# Patient Record
Sex: MALE | Race: WHITE
[De-identification: names, ages, dates, MRNs, and addresses within clinical notes are randomized per-mention and may not be internally consistent; named-entity substitution may affect disease eponyms.]

---

## 2018-10-25 ENCOUNTER — HOSPITAL ENCOUNTER (INPATIENT)
Dept: HOSPITAL 83 - ED | Age: 78
LOS: 1 days | Discharge: HOME | DRG: 552 | End: 2018-10-26
Attending: INTERNAL MEDICINE | Admitting: INTERNAL MEDICINE
Payer: MEDICARE

## 2018-10-25 VITALS — DIASTOLIC BLOOD PRESSURE: 68 MMHG

## 2018-10-25 VITALS — DIASTOLIC BLOOD PRESSURE: 64 MMHG

## 2018-10-25 VITALS — DIASTOLIC BLOOD PRESSURE: 65 MMHG

## 2018-10-25 VITALS — SYSTOLIC BLOOD PRESSURE: 152 MMHG | DIASTOLIC BLOOD PRESSURE: 74 MMHG

## 2018-10-25 VITALS — DIASTOLIC BLOOD PRESSURE: 60 MMHG

## 2018-10-25 VITALS — BODY MASS INDEX: 27.08 KG/M2 | HEIGHT: 70 IN | WEIGHT: 189.13 LBS

## 2018-10-25 VITALS — DIASTOLIC BLOOD PRESSURE: 44 MMHG | SYSTOLIC BLOOD PRESSURE: 160 MMHG

## 2018-10-25 DIAGNOSIS — K21.9: ICD-10-CM

## 2018-10-25 DIAGNOSIS — E78.5: ICD-10-CM

## 2018-10-25 DIAGNOSIS — D72.829: ICD-10-CM

## 2018-10-25 DIAGNOSIS — I25.2: ICD-10-CM

## 2018-10-25 DIAGNOSIS — M53.86: ICD-10-CM

## 2018-10-25 DIAGNOSIS — M48.061: Primary | ICD-10-CM

## 2018-10-25 DIAGNOSIS — Z79.82: ICD-10-CM

## 2018-10-25 DIAGNOSIS — Z79.899: ICD-10-CM

## 2018-10-25 DIAGNOSIS — Z88.2: ICD-10-CM

## 2018-10-25 DIAGNOSIS — I25.10: ICD-10-CM

## 2018-10-25 DIAGNOSIS — R31.21: ICD-10-CM

## 2018-10-25 DIAGNOSIS — Z95.5: ICD-10-CM

## 2018-10-25 DIAGNOSIS — I10: ICD-10-CM

## 2018-10-25 DIAGNOSIS — R00.1: ICD-10-CM

## 2018-10-25 DIAGNOSIS — B02.30: ICD-10-CM

## 2018-10-25 DIAGNOSIS — H26.491: ICD-10-CM

## 2018-10-25 DIAGNOSIS — R73.9: ICD-10-CM

## 2018-10-25 LAB
25(OH)D3 SERPL-MCNC: 44.1 NG/ML (ref 30–100)
ALBUMIN SERPL-MCNC: 4 GM/DL (ref 3.1–4.5)
ALP SERPL-CCNC: 52 U/L (ref 45–117)
ALT SERPL W P-5'-P-CCNC: 32 U/L (ref 12–78)
APPEARANCE UR: (no result)
APTT PPP: 23.1 SECONDS (ref 20.8–31.5)
AST SERPL-CCNC: 22 IU/L (ref 3–35)
BACTERIA #/AREA URNS HPF: (no result) /[HPF]
BILIRUB UR QL STRIP: NEGATIVE
BUN SERPL-MCNC: 24 MG/DL (ref 7–24)
CHLORIDE SERPL-SCNC: 107 MMOL/L (ref 98–107)
CHOLEST SERPL-MCNC: 101 MG/DL (ref ?–200)
COLOR UR: YELLOW
CREAT SERPL-MCNC: 1.21 MG/DL (ref 0.7–1.3)
EPI CELLS #/AREA URNS HPF: (no result) /[HPF]
ERYTHROCYTE [DISTWIDTH] IN BLOOD BY AUTOMATED COUNT: 12.5 % (ref 0–14.5)
GLUCOSE UR QL: NEGATIVE
HCT VFR BLD AUTO: 40.6 % (ref 42–52)
HDLC SERPL-MCNC: 34 MG/DL (ref 40–60)
HGB BLD-MCNC: 14.2 G/DL (ref 14–18)
HGB UR QL STRIP: NEGATIVE
INR BLD: 1 (ref 2–3.5)
KETONES UR QL STRIP: NEGATIVE
LDLC SERPL DIRECT ASSAY-MCNC: 55 MG/DL (ref 9–159)
LEUKOCYTE ESTERASE UR QL STRIP: NEGATIVE
MCH RBC QN AUTO: 32.9 PG (ref 27–31)
MCHC RBC AUTO-ENTMCNC: 35 G/DL (ref 33–37)
MCV RBC AUTO: 94 FL (ref 80–94)
NITRITE UR QL STRIP: NEGATIVE
NRBC BLD QL AUTO: 0 10*3/UL (ref 0–0)
PH UR STRIP: 7.5 [PH] (ref 5–9)
PHOSPHATE SERPL-MCNC: 2.7 MG/DL (ref 2.5–4.9)
PLATELET # BLD AUTO: 164 10*3/UL (ref 130–400)
PLATELET SUFFICIENCY: NORMAL
PMV BLD AUTO: 9.1 FL (ref 9.6–12.3)
POTASSIUM SERPL-SCNC: 3.8 MMOL/L (ref 3.5–5.1)
PROT SERPL-MCNC: 6.7 GM/DL (ref 6.4–8.2)
RBC # BLD AUTO: 4.32 10*6/UL (ref 4.5–5.9)
RBC #/AREA URNS HPF: (no result) RBC/HPF (ref 0–2)
RBC MORPH BLD: NORMAL
SODIUM SERPL-SCNC: 142 MMOL/L (ref 136–145)
SP GR UR: 1.01 (ref 1–1.03)
T4 FREE SERPL-MCNC: 1.02 NG/DL (ref 0.76–1.46)
TOTAL CELLS COUNTED: 100 #CELLS
TRIGL SERPL-MCNC: 58 MG/DL (ref ?–150)
TSH SERPL DL<=0.005 MIU/L-ACNC: 1.32 UIU/ML (ref 0.36–4.75)
UROBILINOGEN UR STRIP-MCNC: 0.2 E.U./DL (ref 0.2–1)
VITAMIN B12: 723 PG/ML (ref 247–911)
VLDLC SERPL CALC-MCNC: 12 MG/DL (ref 6–40)
WBC #/AREA URNS HPF: (no result) WBC/HPF (ref 0–5)
WBC NRBC COR # BLD AUTO: 12.1 10*3/UL (ref 4.8–10.8)

## 2018-10-26 VITALS — SYSTOLIC BLOOD PRESSURE: 123 MMHG | DIASTOLIC BLOOD PRESSURE: 67 MMHG

## 2018-10-26 VITALS — DIASTOLIC BLOOD PRESSURE: 58 MMHG | SYSTOLIC BLOOD PRESSURE: 124 MMHG

## 2019-12-09 ENCOUNTER — HOSPITAL ENCOUNTER (INPATIENT)
Dept: HOSPITAL 83 - ED | Age: 79
LOS: 1 days | Discharge: HOME | DRG: 641 | End: 2019-12-10
Attending: INTERNAL MEDICINE | Admitting: INTERNAL MEDICINE
Payer: MEDICARE

## 2019-12-09 VITALS — SYSTOLIC BLOOD PRESSURE: 150 MMHG | DIASTOLIC BLOOD PRESSURE: 71 MMHG

## 2019-12-09 VITALS — DIASTOLIC BLOOD PRESSURE: 68 MMHG | BODY MASS INDEX: 26.38 KG/M2 | HEIGHT: 70 IN | WEIGHT: 184.25 LBS

## 2019-12-09 VITALS — SYSTOLIC BLOOD PRESSURE: 130 MMHG | DIASTOLIC BLOOD PRESSURE: 60 MMHG

## 2019-12-09 VITALS — DIASTOLIC BLOOD PRESSURE: 62 MMHG

## 2019-12-09 VITALS — DIASTOLIC BLOOD PRESSURE: 72 MMHG

## 2019-12-09 DIAGNOSIS — T44.7X5A: ICD-10-CM

## 2019-12-09 DIAGNOSIS — Z80.6: ICD-10-CM
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
## 2019-12-09 DIAGNOSIS — I10: ICD-10-CM

## 2019-12-09 DIAGNOSIS — R00.1: ICD-10-CM

## 2019-12-09 DIAGNOSIS — E78.5: ICD-10-CM

## 2019-12-09 DIAGNOSIS — E86.0: Primary | ICD-10-CM

## 2019-12-09 DIAGNOSIS — Z79.82: ICD-10-CM

## 2019-12-09 DIAGNOSIS — Z88.2: ICD-10-CM

## 2019-12-09 DIAGNOSIS — R73.9: ICD-10-CM

## 2019-12-09 DIAGNOSIS — K21.9: ICD-10-CM

## 2019-12-09 DIAGNOSIS — I25.10: ICD-10-CM

## 2019-12-09 DIAGNOSIS — Z80.41: ICD-10-CM

## 2019-12-09 DIAGNOSIS — I25.2: ICD-10-CM

## 2019-12-09 DIAGNOSIS — E83.41: ICD-10-CM

## 2019-12-09 LAB
ALBUMIN SERPL-MCNC: 3.8 GM/DL (ref 3.1–4.5)
ALP SERPL-CCNC: 54 U/L (ref 45–117)
ALT SERPL W P-5'-P-CCNC: 38 U/L (ref 12–78)
APTT PPP: 22.5 SECONDS (ref 20–32.1)
AST SERPL-CCNC: 27 IU/L (ref 3–35)
BASOPHILS # BLD AUTO: 0 10*3/UL (ref 0–0.1)
BASOPHILS NFR BLD AUTO: 0.4 % (ref 0–1)
BUN SERPL-MCNC: 18 MG/DL (ref 7–24)
CHLORIDE SERPL-SCNC: 106 MMOL/L (ref 98–107)
CREAT SERPL-MCNC: 1.27 MG/DL (ref 0.7–1.3)
EOSINOPHIL # BLD AUTO: 0.3 10*3/UL (ref 0–0.4)
EOSINOPHIL # BLD AUTO: 3.3 % (ref 1–4)
ERYTHROCYTE [DISTWIDTH] IN BLOOD BY AUTOMATED COUNT: 12.9 % (ref 0–14.5)
HCT VFR BLD AUTO: 41.3 % (ref 42–52)
HGB BLD-MCNC: 14.2 G/DL (ref 14–18)
INR BLD: 0.9 (ref 2–3.5)
LIPASE SERPL-CCNC: 125 U/L (ref 73–393)
LYMPHOCYTES # BLD AUTO: 4.5 10*3/UL (ref 1.3–4.4)
LYMPHOCYTES NFR BLD AUTO: 43.5 % (ref 27–41)
MCH RBC QN AUTO: 32.9 PG (ref 27–31)
MCHC RBC AUTO-ENTMCNC: 34.4 G/DL (ref 33–37)
MCV RBC AUTO: 95.6 FL (ref 80–94)
MONOCYTES # BLD AUTO: 0.7 10*3/UL (ref 0.1–1)
MONOCYTES NFR BLD MANUAL: 7.1 % (ref 3–9)
NEUT #: 4.7 10*3/UL (ref 2.3–7.9)
NEUT %: 45.2 % (ref 47–73)
NRBC BLD QL AUTO: 0 10*3/UL (ref 0–0)
PLATELET # BLD AUTO: 163 10*3/UL (ref 130–400)
PMV BLD AUTO: 9.9 FL (ref 9.6–12.3)
POTASSIUM SERPL-SCNC: 4.3 MMOL/L (ref 3.5–5.1)
PROT SERPL-MCNC: 6.6 GM/DL (ref 6.4–8.2)
RBC # BLD AUTO: 4.32 10*6/UL (ref 4.5–5.9)
SODIUM SERPL-SCNC: 139 MMOL/L (ref 136–145)
TROPONIN I SERPL-MCNC: < 0.015 NG/ML (ref ?–0.04)
WBC NRBC COR # BLD AUTO: 10.4 10*3/UL (ref 4.8–10.8)

## 2019-12-09 NOTE — NUR
A 79, admitted to , under the
services of GREGORY Vizcaino DO with a diagnosis of SYNCOPE.
Chief complaint is SYNCOPAL EPISODE.
Patient arrived via ambulance from ER.
Monitor applied. Initial assessment completed.
Vital signs taken and recorded.
GREGORY VIZCAINO DO notified of admission to the unit.
Orders received.
See assessment for past medical history, medications
and allergies.
Patient and/or family oriented to unit. [facility]
visitation policy reviewed.
Clothing/patient valuable form completed.
 
EYAD HODGE

## 2019-12-10 VITALS — SYSTOLIC BLOOD PRESSURE: 130 MMHG | DIASTOLIC BLOOD PRESSURE: 60 MMHG

## 2019-12-10 VITALS — DIASTOLIC BLOOD PRESSURE: 62 MMHG | SYSTOLIC BLOOD PRESSURE: 114 MMHG

## 2019-12-10 VITALS — DIASTOLIC BLOOD PRESSURE: 59 MMHG | SYSTOLIC BLOOD PRESSURE: 144 MMHG

## 2019-12-10 LAB
25(OH)D3 SERPL-MCNC: 46.3 NG/ML (ref 30–100)
ALBUMIN SERPL-MCNC: 3.4 GM/DL (ref 3.1–4.5)
ALP SERPL-CCNC: 48 U/L (ref 45–117)
ALT SERPL W P-5'-P-CCNC: 32 U/L (ref 12–78)
APTT PPP: 25.1 SECONDS (ref 20–32.1)
AST SERPL-CCNC: 19 IU/L (ref 3–35)
BASOPHILS # BLD AUTO: 0.1 10*3/UL (ref 0–0.1)
BASOPHILS NFR BLD AUTO: 0.5 % (ref 0–1)
BUN SERPL-MCNC: 19 MG/DL (ref 7–24)
CHLORIDE SERPL-SCNC: 110 MMOL/L (ref 98–107)
CHOLEST SERPL-MCNC: 108 MG/DL (ref ?–200)
CREAT SERPL-MCNC: 1.15 MG/DL (ref 0.7–1.3)
EOSINOPHIL # BLD AUTO: 0.4 10*3/UL (ref 0–0.4)
EOSINOPHIL # BLD AUTO: 4 % (ref 1–4)
ERYTHROCYTE [DISTWIDTH] IN BLOOD BY AUTOMATED COUNT: 12.9 % (ref 0–14.5)
HCT VFR BLD AUTO: 38.9 % (ref 42–52)
HDLC SERPL-MCNC: 27 MG/DL (ref 40–60)
HGB BLD-MCNC: 13 G/DL (ref 14–18)
INR BLD: 1 (ref 2–3.5)
LDLC SERPL DIRECT ASSAY-MCNC: 61 MG/DL (ref 9–159)
LYMPHOCYTES # BLD AUTO: 3.8 10*3/UL (ref 1.3–4.4)
LYMPHOCYTES NFR BLD AUTO: 41.5 % (ref 27–41)
MCH RBC QN AUTO: 32.1 PG (ref 27–31)
MCHC RBC AUTO-ENTMCNC: 33.4 G/DL (ref 33–37)
MCV RBC AUTO: 96 FL (ref 80–94)
MONOCYTES # BLD AUTO: 1.1 10*3/UL (ref 0.1–1)
MONOCYTES NFR BLD MANUAL: 11.8 % (ref 3–9)
NEUT #: 3.9 10*3/UL (ref 2.3–7.9)
NEUT %: 41.9 % (ref 47–73)
NRBC BLD QL AUTO: 0 10*3/UL (ref 0–0)
PHOSPHATE SERPL-MCNC: 3.3 MG/DL (ref 2.5–4.9)
PLATELET # BLD AUTO: 160 10*3/UL (ref 130–400)
PMV BLD AUTO: 10 FL (ref 9.6–12.3)
POTASSIUM SERPL-SCNC: 4 MMOL/L (ref 3.5–5.1)
PROT SERPL-MCNC: 6 GM/DL (ref 6.4–8.2)
RBC # BLD AUTO: 4.05 10*6/UL (ref 4.5–5.9)
SODIUM SERPL-SCNC: 142 MMOL/L (ref 136–145)
T4 FREE SERPL-MCNC: 0.89 NG/DL (ref 0.76–1.46)
TRIGL SERPL-MCNC: 99 MG/DL (ref ?–150)
TSH SERPL DL<=0.005 MIU/L-ACNC: 0.95 UIU/ML (ref 0.36–4.75)
VITAMIN B12: 592 PG/ML (ref 247–911)
VLDLC SERPL CALC-MCNC: 20 MG/DL (ref 6–40)
WBC NRBC COR # BLD AUTO: 9.3 10*3/UL (ref 4.8–10.8)

## 2019-12-10 NOTE — NUR
Discharge instructions reviewed with patient/family. Patient receptive and
verbalizes understanding. Follow-up care arranged. Written instructions given
to patient/family. IV site and telemetry monitor removed.
EYAD HODGE

## 2019-12-10 NOTE — NUR
in to talk to patient.
Patient states lives at home with his wife.
There are 12 steps in the home.
Physician: Dr. Kyler Frederick
Pharmacy: [facility]
Home health services: none
Patient's level of ADLs: INDEPENDENT
Patient has working utilities: yes
DME: none
Follow-up physician's appointment after d/c: will be made by the hospitalist
nurse director upon discharge
Does patient want to access PORTAL?: no
Discharge plan discussed with patient. He lives at home with his wife. He is
independent in his ADLs and ambulation. Discussed home health care services and
he denies any home needs at this time. When medically stable he will be
discharged to home. His wife will provide transportation on discharge.
 
ADOLFO RENEE

## 2020-05-11 ENCOUNTER — HOSPITAL ENCOUNTER (EMERGENCY)
Dept: HOSPITAL 83 - ED | Age: 80
Discharge: HOME | End: 2020-05-11
Payer: MEDICARE

## 2020-05-11 VITALS — HEIGHT: 70 IN | WEIGHT: 180 LBS | BODY MASS INDEX: 25.77 KG/M2

## 2020-05-11 DIAGNOSIS — K21.9: ICD-10-CM

## 2020-05-11 DIAGNOSIS — E78.00: ICD-10-CM

## 2020-05-11 DIAGNOSIS — I25.2: ICD-10-CM

## 2020-05-11 DIAGNOSIS — R11.0: Primary | ICD-10-CM

## 2020-05-11 DIAGNOSIS — E78.5: ICD-10-CM

## 2020-05-11 DIAGNOSIS — Z88.2: ICD-10-CM

## 2020-05-11 DIAGNOSIS — R10.9: ICD-10-CM

## 2020-05-11 DIAGNOSIS — I25.10: ICD-10-CM

## 2020-05-11 DIAGNOSIS — Z79.899: ICD-10-CM

## 2020-05-11 DIAGNOSIS — Z79.82: ICD-10-CM

## 2020-05-11 LAB
ALBUMIN SERPL-MCNC: 3.7 GM/DL (ref 3.1–4.5)
ALP SERPL-CCNC: 52 U/L (ref 45–117)
ALT SERPL W P-5'-P-CCNC: 35 U/L (ref 12–78)
APTT PPP: 25.6 SECONDS (ref 20–32.1)
AST SERPL-CCNC: 35 IU/L (ref 3–35)
BUN SERPL-MCNC: 19 MG/DL (ref 7–24)
CHLORIDE SERPL-SCNC: 109 MMOL/L (ref 98–107)
CREAT SERPL-MCNC: 1.07 MG/DL (ref 0.7–1.3)
ERYTHROCYTE [DISTWIDTH] IN BLOOD BY AUTOMATED COUNT: 12.7 % (ref 0–14.5)
HCT VFR BLD AUTO: 41.6 % (ref 42–52)
INR BLD: 1 (ref 2–3.5)
LIPASE SERPL-CCNC: 101 U/L (ref 73–393)
MCH RBC QN AUTO: 32 PG (ref 27–31)
MCHC RBC AUTO-ENTMCNC: 33.9 G/DL (ref 33–37)
MCV RBC AUTO: 94.5 FL (ref 80–94)
NRBC BLD QL AUTO: 0 10*3/UL (ref 0–0)
PLATELET # BLD AUTO: 180 10*3/UL (ref 130–400)
PLATELET SUFFICIENCY: NORMAL
PMV BLD AUTO: 9.5 FL (ref 9.6–12.3)
POTASSIUM SERPL-SCNC: 3.8 MMOL/L (ref 3.5–5.1)
PROT SERPL-MCNC: 6.6 GM/DL (ref 6.4–8.2)
RBC # BLD AUTO: 4.4 10*6/UL (ref 4.5–5.9)
RBC MORPH BLD: NORMAL
SODIUM SERPL-SCNC: 141 MMOL/L (ref 136–145)
TOTAL CELLS COUNTED: 100 #CELLS
TROPONIN I SERPL-MCNC: < 0.015 NG/ML (ref ?–0.04)
VARIANT LYMPHS NFR BLD MANUAL: 1 % (ref 0–0)
WBC NRBC COR # BLD AUTO: 10.3 10*3/UL (ref 4.8–10.8)

## 2021-02-19 ENCOUNTER — HOSPITAL ENCOUNTER (OUTPATIENT)
Dept: HOSPITAL 83 - ORTHO | Age: 81
Discharge: HOME | End: 2021-02-19
Attending: ORTHOPAEDIC SURGERY
Payer: MEDICARE

## 2021-02-19 DIAGNOSIS — M17.12: Primary | ICD-10-CM

## 2021-04-19 VITALS — DIASTOLIC BLOOD PRESSURE: 67 MMHG | SYSTOLIC BLOOD PRESSURE: 152 MMHG

## 2021-04-19 LAB
BUN SERPL-MCNC: 22 MG/DL (ref 7–24)
CHLORIDE SERPL-SCNC: 109 MMOL/L (ref 98–107)
CREAT SERPL-MCNC: 1.08 MG/DL (ref 0.7–1.3)
POTASSIUM SERPL-SCNC: 4.4 MMOL/L (ref 3.5–5.1)
SODIUM SERPL-SCNC: 143 MMOL/L (ref 136–145)

## 2021-04-22 ENCOUNTER — HOSPITAL ENCOUNTER (OUTPATIENT)
Dept: HOSPITAL 83 - SDC | Age: 81
Discharge: HOME | End: 2021-04-22
Attending: ORTHOPAEDIC SURGERY
Payer: MEDICARE

## 2021-04-22 VITALS — DIASTOLIC BLOOD PRESSURE: 70 MMHG | SYSTOLIC BLOOD PRESSURE: 130 MMHG

## 2021-04-22 VITALS — SYSTOLIC BLOOD PRESSURE: 138 MMHG | DIASTOLIC BLOOD PRESSURE: 55 MMHG

## 2021-04-22 VITALS — BODY MASS INDEX: 25.77 KG/M2 | HEIGHT: 70 IN | WEIGHT: 180 LBS

## 2021-04-22 VITALS — DIASTOLIC BLOOD PRESSURE: 74 MMHG | SYSTOLIC BLOOD PRESSURE: 119 MMHG

## 2021-04-22 VITALS — DIASTOLIC BLOOD PRESSURE: 63 MMHG

## 2021-04-22 DIAGNOSIS — I25.10: ICD-10-CM

## 2021-04-22 DIAGNOSIS — E78.00: ICD-10-CM

## 2021-04-22 DIAGNOSIS — Z79.899: ICD-10-CM

## 2021-04-22 DIAGNOSIS — Z95.5: ICD-10-CM

## 2021-04-22 DIAGNOSIS — M17.12: ICD-10-CM

## 2021-04-22 DIAGNOSIS — K21.9: ICD-10-CM

## 2021-04-22 DIAGNOSIS — M65.841: Primary | ICD-10-CM

## 2021-04-22 DIAGNOSIS — I10: ICD-10-CM

## 2021-04-22 DIAGNOSIS — I25.2: ICD-10-CM

## 2021-04-22 DIAGNOSIS — Z88.2: ICD-10-CM

## 2021-05-29 ENCOUNTER — HOSPITAL ENCOUNTER (EMERGENCY)
Dept: HOSPITAL 83 - ED | Age: 81
Discharge: HOME | End: 2021-05-29
Payer: MEDICARE

## 2021-05-29 VITALS — HEIGHT: 60 IN
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
## 2021-05-29 DIAGNOSIS — Z88.2: ICD-10-CM

## 2021-05-29 DIAGNOSIS — S61.452A: Primary | ICD-10-CM

## 2021-05-29 DIAGNOSIS — Z79.82: ICD-10-CM

## 2021-05-29 DIAGNOSIS — I25.2: ICD-10-CM

## 2021-05-29 DIAGNOSIS — Z95.5: ICD-10-CM

## 2021-05-29 DIAGNOSIS — Z79.899: ICD-10-CM

## 2021-05-29 DIAGNOSIS — Z90.89: ICD-10-CM
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
## 2022-02-26 ENCOUNTER — HOSPITAL ENCOUNTER (EMERGENCY)
Dept: HOSPITAL 83 - ED | Age: 82
Discharge: HOME | End: 2022-02-26
Payer: MEDICARE

## 2022-02-26 VITALS — WEIGHT: 192 LBS | BODY MASS INDEX: 27.49 KG/M2 | HEIGHT: 70 IN

## 2022-02-26 DIAGNOSIS — Z79.899: ICD-10-CM

## 2022-02-26 DIAGNOSIS — Z90.89: ICD-10-CM

## 2022-02-26 DIAGNOSIS — N13.2: Primary | ICD-10-CM

## 2022-02-26 DIAGNOSIS — Z98.890: ICD-10-CM

## 2022-02-26 DIAGNOSIS — Z88.2: ICD-10-CM

## 2022-02-26 DIAGNOSIS — Z79.82: ICD-10-CM

## 2022-02-26 LAB
ALP SERPL-CCNC: 59 U/L (ref 45–117)
ALT SERPL W P-5'-P-CCNC: 28 U/L (ref 12–78)
AST SERPL-CCNC: 21 IU/L (ref 3–35)
BACTERIA #/AREA URNS HPF: (no result) /[HPF]
BASO STIPL BLD QL SMEAR: SLIGHT
BUN SERPL-MCNC: 29 MG/DL (ref 7–24)
CHLORIDE SERPL-SCNC: 109 MMOL/L (ref 98–107)
CREAT SERPL-MCNC: 1.73 MG/DL (ref 0.7–1.3)
EPI CELLS #/AREA URNS HPF: (no result) /[HPF]
ERYTHROCYTE [DISTWIDTH] IN BLOOD BY AUTOMATED COUNT: 13 % (ref 0–14.5)
HCT VFR BLD AUTO: 40.7 % (ref 42–52)
LEUKOCYTE ESTERASE UR QL STRIP: (no result)
MANUAL DIFFERENTIAL PERFORMED BLD QL: YES
MCH RBC QN AUTO: 32.3 PG (ref 27–31)
MCHC RBC AUTO-ENTMCNC: 34.9 G/DL (ref 33–37)
MCV RBC AUTO: 92.7 FL (ref 80–94)
MUCOUS THREADS URNS QL MICRO: (no result)
NRBC BLD QL AUTO: 0 % (ref 0–0)
OVALOCYTES BLD QL SMEAR: (no result)
PH UR STRIP: 7.5 [PH] (ref 4.5–8)
PLATELET # BLD AUTO: 167 10*3/UL (ref 130–400)
PLATELET SUFFICIENCY: NORMAL
PMV BLD AUTO: 9.6 FL (ref 9.6–12.3)
POLYCHROMASIA BLD QL SMEAR: SLIGHT
POTASSIUM SERPL-SCNC: 3.8 MMOL/L (ref 3.5–5.1)
PROT SERPL-MCNC: 6.9 GM/DL (ref 6.4–8.2)
RBC # BLD AUTO: 4.39 10*6/UL (ref 4.5–5.9)
RBC #/AREA URNS HPF: (no result) RBC/HPF (ref 0–2)
SODIUM SERPL-SCNC: 141 MMOL/L (ref 136–145)
SP GR UR: 1.01 (ref 1–1.03)
TOTAL CELLS COUNTED: 100 #CELLS
UROBILINOGEN UR STRIP-MCNC: 0.2 E.U./DL (ref 0–1)
VARIANT LYMPHS NFR BLD MANUAL: 1 % (ref 0–0)
WBC NRBC COR # BLD AUTO: 17.8 10*3/UL (ref 4.8–10.8)

## 2023-04-15 ENCOUNTER — HOSPITAL ENCOUNTER (EMERGENCY)
Dept: HOSPITAL 83 - ED | Age: 83
Discharge: HOME | End: 2023-04-15
Payer: MEDICARE

## 2023-04-15 VITALS — BODY MASS INDEX: 25.73 KG/M2 | HEIGHT: 71.97 IN | WEIGHT: 190 LBS

## 2023-04-15 DIAGNOSIS — M54.31: Primary | ICD-10-CM

## 2023-04-15 DIAGNOSIS — M79.604: ICD-10-CM

## 2023-04-15 DIAGNOSIS — I25.10: ICD-10-CM

## 2023-04-15 DIAGNOSIS — E78.00: ICD-10-CM

## 2023-04-15 DIAGNOSIS — I25.2: ICD-10-CM

## 2023-04-15 DIAGNOSIS — Z88.2: ICD-10-CM

## 2023-04-15 DIAGNOSIS — K21.9: ICD-10-CM

## 2023-04-15 DIAGNOSIS — Z98.890: ICD-10-CM

## 2023-04-15 DIAGNOSIS — Z90.89: ICD-10-CM
